# Patient Record
Sex: FEMALE | Race: WHITE | NOT HISPANIC OR LATINO | Employment: FULL TIME | URBAN - METROPOLITAN AREA
[De-identification: names, ages, dates, MRNs, and addresses within clinical notes are randomized per-mention and may not be internally consistent; named-entity substitution may affect disease eponyms.]

---

## 2021-02-14 ENCOUNTER — OFFICE VISIT (OUTPATIENT)
Dept: URGENT CARE | Facility: PHYSICIAN GROUP | Age: 26
End: 2021-02-14
Payer: COMMERCIAL

## 2021-02-14 ENCOUNTER — HOSPITAL ENCOUNTER (OUTPATIENT)
Dept: RADIOLOGY | Facility: MEDICAL CENTER | Age: 26
End: 2021-02-14
Attending: FAMILY MEDICINE
Payer: COMMERCIAL

## 2021-02-14 VITALS
BODY MASS INDEX: 31.02 KG/M2 | SYSTOLIC BLOOD PRESSURE: 110 MMHG | WEIGHT: 158 LBS | OXYGEN SATURATION: 95 % | DIASTOLIC BLOOD PRESSURE: 76 MMHG | HEART RATE: 99 BPM | TEMPERATURE: 97.6 F | HEIGHT: 60 IN | RESPIRATION RATE: 14 BRPM

## 2021-02-14 DIAGNOSIS — S93.402A SPRAIN OF LEFT ANKLE, UNSPECIFIED LIGAMENT, INITIAL ENCOUNTER: ICD-10-CM

## 2021-02-14 PROCEDURE — 99203 OFFICE O/P NEW LOW 30 MIN: CPT | Performed by: FAMILY MEDICINE

## 2021-02-14 PROCEDURE — 73610 X-RAY EXAM OF ANKLE: CPT | Mod: LT

## 2021-02-14 PROCEDURE — 73630 X-RAY EXAM OF FOOT: CPT | Mod: LT

## 2021-02-14 RX ORDER — NORETHINDRONE ACETATE/ETHINYL ESTRADIOL 1MG-20MCG
KIT ORAL
COMMUNITY
Start: 2021-02-02

## 2021-02-14 ASSESSMENT — PAIN SCALES - GENERAL: PAINLEVEL: 5=MODERATE PAIN

## 2021-02-14 NOTE — PROGRESS NOTES
Subjective:      Chief Complaint   Patient presents with   • Ankle Injury     left ankle injury, fell and hit head and ankle, swelling,              Ankle Injury   The incident occurred LAST NIGHT. . The incident occurred while: getting out of bed. The injury mechanism was an inversion injury. The pain is present in the left ankle. The pain is moderate. The pain has been constant since onset. Pertinent negatives include no inability to bear weight, loss of motion, muscle weakness, numbness or tingling. The symptoms are aggravated by weight bearing and palpation. pt has tried acetaminophen for the symptoms. The treatment provided mild relief.       Social History     Tobacco Use   • Smoking status: Never Smoker   • Smokeless tobacco: Never Used   Substance Use Topics   • Alcohol use: Yes     Comment: rare   • Drug use: Never         No past medical history on file.      Review of Systems   Constitutional: Negative for fever.   Respiratory: Negative for shortness of breath.    Cardiovascular: Negative for chest pain.   Neurological: Negative for tingling and numbness.   All other systems reviewed and are negative.         Objective:     /76   Pulse 99   Temp 36.4 °C (97.6 °F)   Resp 14   Ht 1.524 m (5')   Wt 71.7 kg (158 lb)   SpO2 95%     Physical Exam   Constitutional: pt is oriented to person, place, and time. Pt appears well-developed. No distress.   HENT:   Head: Normocephalic and atraumatic.   Eyes: Conjunctivae are normal.   Cardiovascular: Normal rate and regular rhythm.    Pulmonary/Chest: Effort normal and breath sounds normal.   Musculoskeletal:        Left ankle: pt exhibits swelling and ecchymosis. Pt exhibits normal range of motion. Tenderness. Lateral malleolus tenderness found. Achilles tendon normal.        Left foot: There is normal range of motion, normal capillary refill and no crepitus.   Neurological: pt is alert and oriented to person, place, and time. No cranial nerve deficit.      Skin: Skin is warm. Pt is not diaphoretic. No erythema.   Psychiatric: His behavior is normal.   Nursing note and vitals reviewed.         DX-FOOT-COMPLETE 3+ LEFT  Order: [id number]  Status:  Final result   Visible to patient:  No (scheduled for 2/16/2021  1:03 AM) Next appt:  None Dx:  Sprain of left ankle, unspecified lig...  Details    Reading Physician Reading Date Result Priority   Sin Rodriguez M.D.  [phone] 2/14/2021 Urgent [facility]      Narrative & Impression        2/14/2021 12:42 PM     HISTORY/REASON FOR EXAM:  Pain/Deformity Following Trauma  Foot swelling     TECHNIQUE/EXAM DESCRIPTION AND NUMBER OF VIEWS:  3 views of the LEFT foot.     COMPARISON:  None.     FINDINGS:     No acute fracture or dislocation.     No joint osteoarthritis.        IMPRESSION:        No acute osseous abnormality.           Reading Physician Reading Date Result Priority   Sin Rodriguez M.D.  [phone] 2/14/2021 Urgent Care      Narrative & Impression        2/14/2021 12:42 PM     HISTORY/REASON FOR EXAM:  pain; Pain/Deformity Following Trauma  Ankle swelling     TECHNIQUE/EXAM DESCRIPTION AND NUMBER OF VIEWS:  3 views of the LEFT ankle.     COMPARISON: None.     FINDINGS:  No acute fracture or dislocation. The ankle mortise is intact.     No joint arthropathy.     IMPRESSION:     No acute osseous abnormality.                          Assessment/Plan:      1. Sprain of left ankle, unspecified ligament, initial encounter     X-rays were personally reviewed by myself.   There is no fracture.       Crutches  aircast splint  rx motrin 800mg tid prn      Follow up in one week if no improvement, sooner if symptoms worsen.

## 2021-03-18 ENCOUNTER — HOSPITAL ENCOUNTER (EMERGENCY)
Facility: MEDICAL CENTER | Age: 26
End: 2021-03-18
Attending: EMERGENCY MEDICINE
Payer: COMMERCIAL

## 2021-03-18 ENCOUNTER — OFFICE VISIT (OUTPATIENT)
Dept: URGENT CARE | Facility: CLINIC | Age: 26
End: 2021-03-18
Payer: COMMERCIAL

## 2021-03-18 ENCOUNTER — APPOINTMENT (OUTPATIENT)
Dept: RADIOLOGY | Facility: MEDICAL CENTER | Age: 26
End: 2021-03-18
Attending: EMERGENCY MEDICINE
Payer: COMMERCIAL

## 2021-03-18 VITALS
HEART RATE: 96 BPM | HEIGHT: 60 IN | TEMPERATURE: 99.7 F | OXYGEN SATURATION: 96 % | BODY MASS INDEX: 30.63 KG/M2 | DIASTOLIC BLOOD PRESSURE: 70 MMHG | RESPIRATION RATE: 16 BRPM | SYSTOLIC BLOOD PRESSURE: 120 MMHG | WEIGHT: 156 LBS

## 2021-03-18 VITALS
DIASTOLIC BLOOD PRESSURE: 75 MMHG | BODY MASS INDEX: 30.53 KG/M2 | OXYGEN SATURATION: 96 % | WEIGHT: 156.31 LBS | RESPIRATION RATE: 18 BRPM | TEMPERATURE: 98.7 F | HEART RATE: 98 BPM | SYSTOLIC BLOOD PRESSURE: 117 MMHG

## 2021-03-18 DIAGNOSIS — R07.9 ACUTE CHEST PAIN: ICD-10-CM

## 2021-03-18 DIAGNOSIS — R07.9 CHEST PAIN, UNSPECIFIED TYPE: ICD-10-CM

## 2021-03-18 DIAGNOSIS — R06.02 SHORTNESS OF BREATH: ICD-10-CM

## 2021-03-18 DIAGNOSIS — R06.00 DYSPNEA, UNSPECIFIED TYPE: ICD-10-CM

## 2021-03-18 LAB
ALBUMIN SERPL BCP-MCNC: 4.7 G/DL (ref 3.2–4.9)
ALBUMIN/GLOB SERPL: 1.4 G/DL
ALP SERPL-CCNC: 79 U/L (ref 30–99)
ALT SERPL-CCNC: 15 U/L (ref 2–50)
ANION GAP SERPL CALC-SCNC: 16 MMOL/L (ref 7–16)
AST SERPL-CCNC: 13 U/L (ref 12–45)
BASOPHILS # BLD AUTO: 0.4 % (ref 0–1.8)
BASOPHILS # BLD: 0.07 K/UL (ref 0–0.12)
BILIRUB SERPL-MCNC: 0.4 MG/DL (ref 0.1–1.5)
BUN SERPL-MCNC: 7 MG/DL (ref 8–22)
CALCIUM SERPL-MCNC: 9.7 MG/DL (ref 8.5–10.5)
CHLORIDE SERPL-SCNC: 102 MMOL/L (ref 96–112)
CO2 SERPL-SCNC: 19 MMOL/L (ref 20–33)
CREAT SERPL-MCNC: 0.7 MG/DL (ref 0.5–1.4)
D DIMER PPP IA.FEU-MCNC: <0.27 UG/ML (FEU) (ref 0–0.5)
EKG IMPRESSION: NORMAL
EOSINOPHIL # BLD AUTO: 0.09 K/UL (ref 0–0.51)
EOSINOPHIL NFR BLD: 0.6 % (ref 0–6.9)
ERYTHROCYTE [DISTWIDTH] IN BLOOD BY AUTOMATED COUNT: 37 FL (ref 35.9–50)
GLOBULIN SER CALC-MCNC: 3.4 G/DL (ref 1.9–3.5)
GLUCOSE SERPL-MCNC: 102 MG/DL (ref 65–99)
HCG SERPL QL: NEGATIVE
HCT VFR BLD AUTO: 45.5 % (ref 37–47)
HGB BLD-MCNC: 14.6 G/DL (ref 12–16)
IMM GRANULOCYTES # BLD AUTO: 0.07 K/UL (ref 0–0.11)
IMM GRANULOCYTES NFR BLD AUTO: 0.4 % (ref 0–0.9)
LIPASE SERPL-CCNC: 33 U/L (ref 11–82)
LYMPHOCYTES # BLD AUTO: 1.95 K/UL (ref 1–4.8)
LYMPHOCYTES NFR BLD: 12.3 % (ref 22–41)
MCH RBC QN AUTO: 26.8 PG (ref 27–33)
MCHC RBC AUTO-ENTMCNC: 32.1 G/DL (ref 33.6–35)
MCV RBC AUTO: 83.5 FL (ref 81.4–97.8)
MONOCYTES # BLD AUTO: 1.2 K/UL (ref 0–0.85)
MONOCYTES NFR BLD AUTO: 7.6 % (ref 0–13.4)
NEUTROPHILS # BLD AUTO: 12.5 K/UL (ref 2–7.15)
NEUTROPHILS NFR BLD: 78.7 % (ref 44–72)
NRBC # BLD AUTO: 0 K/UL
NRBC BLD-RTO: 0 /100 WBC
PLATELET # BLD AUTO: 369 K/UL (ref 164–446)
PMV BLD AUTO: 8.8 FL (ref 9–12.9)
POTASSIUM SERPL-SCNC: 4.1 MMOL/L (ref 3.6–5.5)
PROT SERPL-MCNC: 8.1 G/DL (ref 6–8.2)
RBC # BLD AUTO: 5.45 M/UL (ref 4.2–5.4)
SODIUM SERPL-SCNC: 137 MMOL/L (ref 135–145)
TROPONIN T SERPL-MCNC: 6 NG/L (ref 6–19)
WBC # BLD AUTO: 15.9 K/UL (ref 4.8–10.8)

## 2021-03-18 PROCEDURE — 36415 COLL VENOUS BLD VENIPUNCTURE: CPT

## 2021-03-18 PROCEDURE — 84484 ASSAY OF TROPONIN QUANT: CPT

## 2021-03-18 PROCEDURE — 84703 CHORIONIC GONADOTROPIN ASSAY: CPT

## 2021-03-18 PROCEDURE — 99215 OFFICE O/P EST HI 40 MIN: CPT | Performed by: NURSE PRACTITIONER

## 2021-03-18 PROCEDURE — 85379 FIBRIN DEGRADATION QUANT: CPT

## 2021-03-18 PROCEDURE — 93005 ELECTROCARDIOGRAM TRACING: CPT | Performed by: EMERGENCY MEDICINE

## 2021-03-18 PROCEDURE — 71045 X-RAY EXAM CHEST 1 VIEW: CPT

## 2021-03-18 PROCEDURE — 93000 ELECTROCARDIOGRAM COMPLETE: CPT | Performed by: NURSE PRACTITIONER

## 2021-03-18 PROCEDURE — 85025 COMPLETE CBC W/AUTO DIFF WBC: CPT

## 2021-03-18 PROCEDURE — 80053 COMPREHEN METABOLIC PANEL: CPT

## 2021-03-18 PROCEDURE — 83690 ASSAY OF LIPASE: CPT

## 2021-03-18 PROCEDURE — 99283 EMERGENCY DEPT VISIT LOW MDM: CPT

## 2021-03-18 PROCEDURE — 93005 ELECTROCARDIOGRAM TRACING: CPT

## 2021-03-18 ASSESSMENT — ENCOUNTER SYMPTOMS
LOWER EXTREMITY EDEMA: 0
BACK PAIN: 0
COUGH: 0
PALPITATIONS: 0
SHORTNESS OF BREATH: 1
HEADACHES: 0
EYE REDNESS: 0
SORE THROAT: 0
MYALGIAS: 0
IRREGULAR HEARTBEAT: 0
FEVER: 0
VOMITING: 0
CHILLS: 0
NAUSEA: 0
EXERTIONAL CHEST PRESSURE: 1
DIZZINESS: 0
NEAR-SYNCOPE: 0

## 2021-03-19 NOTE — ED PROVIDER NOTES
ED Provider Note    CHIEF COMPLAINT  Chief Complaint   Patient presents with   • Chest Pressure     Pt had the second covid vaccine on Tuesday and has had chills, fever and body aches. Pt started to have nausea today and chest tightness that started at 1200. History of asthma, pt used home albuterol a few times and that helped with the chest tightness. Pt went to  for work up, sent here for labs and cardiac f/u.   • Shortness of Breath        HPI    Primary care provider: FREDDY Danielle   History obtained from: Patient and mother  History limited by: None     Monse Blandon is a 25 y.o. female who presents to the ED with mother complaining of chest discomfort located across her upper chest with shortness of breath.  She reports that the pain is worse with walking, movement or taking a deep breath and slightly better with breathing out.  She denies radiation or pain elsewhere.  She reports having her second COVID-19 vaccine 2 days ago and has had some chills and possibly fever but did not check her temperature.  She also has had body aches.  She noticed the chest tightness and shortness of breath that started around noon today while she was laying on the ground because she was feeling nauseated but did not throw up.  She does have history of asthma and used her albuterol inhaler a couple times which seemed to have helped a little bit.  She went to urgent care and was sent here for further evaluation.  She denies history of heart problems or blood clots.  Maternal grandfather had CABG and CHF runs on the father's side.  Patient denies possibility of pregnancy.  She denies cough/diarrhea/dysuria/rash.     REVIEW OF SYSTEMS  Please see HPI for pertinent positives/negatives.  All other systems reviewed and are negative.     PAST MEDICAL HISTORY  Past Medical History:   Diagnosis Date   • Asthma         SURGICAL HISTORY  History reviewed. No pertinent surgical history.     SOCIAL HISTORY  Social History      Tobacco Use   • Smoking status: Never Smoker   • Smokeless tobacco: Never Used   Substance and Sexual Activity   • Alcohol use: Yes     Comment: rare   • Drug use: Yes     Types: Inhaled     Comment: marijuana   • Sexual activity: Not on file        FAMILY HISTORY  History reviewed. No pertinent family history.     CURRENT MEDICATIONS  Home Medications     Reviewed by Leena Andre R.N. (Registered Nurse) on 03/18/21 at 2037  Med List Status: Complete   Medication Last Dose Status   ADVAIR DISKUS 100-50 MCG/DOSE AEROSOL POWDER, BREATH ACTIVATED  Active   ALBUTEROL SULFATE INH  Active   NELSON 1/20 1-20 MG-MCG per tablet  Active                 ALLERGIES  Allergies   Allergen Reactions   • Sulfa Drugs      childhood        PHYSICAL EXAM  VITAL SIGNS: /75   Pulse 98   Temp 37.1 °C (98.7 °F) (Tympanic)   Resp 18   Wt 70.9 kg (156 lb 4.9 oz)   LMP 02/25/2021   SpO2 96%   BMI 30.53 kg/m²  @[id number]::@     Pulse ox interpretation: 97% I interpret this pulse ox as normal     Cardiac monitor interpretation: Sinus rhythm with heart rate in the 90s as interpreted by me.  The patient presented with chest pain and dyspnea and cardiac monitor was ordered to monitor for dysrhythmia.    Constitutional: Well developed, well nourished, alert in no apparent distress, nontoxic appearance    HENT: No external signs of trauma, normocephalic, mask on due to COVID-19 pandemic  Eyes: PERRL, conjunctiva without erythema, no discharge, no icterus    Neck: Soft and supple, trachea midline, no stridor, no tenderness, no LAD, no JVD, good ROM    Cardiovascular: Regular rate and rhythm, no murmurs/rubs/gallops, strong distal pulses and good perfusion    Thorax & Lungs: No respiratory distress, CTAB   Abdomen: Soft, nontender, nondistended, no guarding, no rebound, normal BS    Back: No CVAT    Extremities: No cyanosis, no edema, no gross deformity, good ROM, no tenderness, intact distal pulses with brisk cap refill     Skin: Warm, dry, no pallor/cyanosis, no rash noted    Lymphatic: No lymphadenopathy noted    Neuro: A/O times 3, no focal deficits noted    Psychiatric: Cooperative, tearful and slightly anxious, normal judgement      DIAGNOSTIC STUDIES / PROCEDURES    EKG  12 Lead EKG obtained at 2027 and interpreted by me:   Rate: 93   Rhythm: Sinus rhythm   Ectopy: None  Intervals: Normal   Axis: Normal   QRS: Normal   ST segments: Normal  T Waves: Normal    Clinical Impression: Sinus rhythm without acute ischemic changes or dysrhythmia       LABS  All labs reviewed by me.     Results for orders placed or performed during the hospital encounter of 03/18/21   CBC with Differential   Result Value Ref Range    WBC 15.9 (H) 4.8 - 10.8 K/uL    RBC 5.45 (H) 4.20 - 5.40 M/uL    Hemoglobin 14.6 12.0 - 16.0 g/dL    Hematocrit 45.5 37.0 - 47.0 %    MCV 83.5 81.4 - 97.8 fL    MCH 26.8 (L) 27.0 - 33.0 pg    MCHC 32.1 (L) 33.6 - 35.0 g/dL    RDW 37.0 35.9 - 50.0 fL    Platelet Count 369 164 - 446 K/uL    MPV 8.8 (L) 9.0 - 12.9 fL    Neutrophils-Polys 78.70 (H) 44.00 - 72.00 %    Lymphocytes 12.30 (L) 22.00 - 41.00 %    Monocytes 7.60 0.00 - 13.40 %    Eosinophils 0.60 0.00 - 6.90 %    Basophils 0.40 0.00 - 1.80 %    Immature Granulocytes 0.40 0.00 - 0.90 %    Nucleated RBC 0.00 /100 WBC    Neutrophils (Absolute) 12.50 (H) 2.00 - 7.15 K/uL    Lymphs (Absolute) 1.95 1.00 - 4.80 K/uL    Monos (Absolute) 1.20 (H) 0.00 - 0.85 K/uL    Eos (Absolute) 0.09 0.00 - 0.51 K/uL    Baso (Absolute) 0.07 0.00 - 0.12 K/uL    Immature Granulocytes (abs) 0.07 0.00 - 0.11 K/uL    NRBC (Absolute) 0.00 K/uL   Complete Metabolic Panel (CMP)   Result Value Ref Range    Sodium 137 135 - 145 mmol/L    Potassium 4.1 3.6 - 5.5 mmol/L    Chloride 102 96 - 112 mmol/L    Co2 19 (L) 20 - 33 mmol/L    Anion Gap 16.0 7.0 - 16.0    Glucose 102 (H) 65 - 99 mg/dL    Bun 7 (L) 8 - 22 mg/dL    Creatinine 0.70 0.50 - 1.40 mg/dL    Calcium 9.7 8.5 - 10.5 mg/dL    AST(SGOT) 13 12 -  45 U/L    ALT(SGPT) 15 2 - 50 U/L    Alkaline Phosphatase 79 30 - 99 U/L    Total Bilirubin 0.4 0.1 - 1.5 mg/dL    Albumin 4.7 3.2 - 4.9 g/dL    Total Protein 8.1 6.0 - 8.2 g/dL    Globulin 3.4 1.9 - 3.5 g/dL    A-G Ratio 1.4 g/dL   Troponin   Result Value Ref Range    Troponin T 6 6 - 19 ng/L   BETA-HCG QUALITATIVE SERUM   Result Value Ref Range    Beta-Hcg Qualitative Serum Negative Negative   D-DIMER   Result Value Ref Range    D-Dimer Screen <0.27 0.00 - 0.50 ug/mL (FEU)   LIPASE   Result Value Ref Range    Lipase 33 11 - 82 U/L   ESTIMATED GFR   Result Value Ref Range    GFR If African American >60 >60 mL/min/1.73 m 2    GFR If Non African American >60 >60 mL/min/1.73 m 2   EKG   Result Value Ref Range    Report       [facility] Emergency Dept[facility]    Test Date:  2021  Pt Name:    LEONA GUADARRAMA                   Department: ER  MRN:        [id number]                      Room:  Gender:     Female                       Technician: [id number]  :        1995                   Requested By:ER TRIAGE PROTOCOL  Order #:    [id number]                    Reading MD:    Measurements  Intervals                                Axis  Rate:       93                           P:          60  AK:         144                          QRS:        78  QRSD:       82                           T:          12  QT:         352  QTc:        438    Interpretive Statements  SINUS RHYTHM  No previous ECG available for comparison          RADIOLOGY  The radiologist's interpretation of all radiological studies have been reviewed by me.     DX-CHEST-PORTABLE (1 VIEW)   Final Result         1.  No acute cardiopulmonary disease.             COURSE & MEDICAL DECISION MAKING  Nursing notes, VS, PMSFHx reviewed in chart.     Review of past medical records shows the patient was seen at urgent care today for her symptoms and sent to the ED for further evaluation.      Differential diagnoses considered include but are not  limited to: AMI, dissection, PE, pneumothorax, CHF/pulm edema, pericardial effusion/tamponade, myocarditis, pericarditis, pneumonia, pleural effusion, pleurisy, costochondritis, esophageal spasm, GERD, gastritis, PUD, hiatal hernia, pancreatitis, muscle strain, neuropathy       Pt risk-stratified as low risk for MACE in the next 6 weeks by HEART Score (0-3): 1    HISTORY  Highly suspicious +2  Moderately suspicious +1  Slightly suspicious 0    EKG  Significant ST depression +2  Nonspecific repolarization disturbance +1  Normal 0    AGE  ? 65 +2  45-65 +1  < 45 0    RISK FACTORS  Hypercholesterolemia, HTN, DM, Cigarette smoking, positive family history, obesity  ? 3 risk factors or history of atherosclerotic disease +2  1-2 risk factors +1  No risk factors known 0    TROPONIN  ? 3× normal limit +2  1-3× normal limit +1  ? normal limit 0      History and physical exam as above.  EKG and chest x-ray were unremarkable.  Labs are also fairly unremarkable except for mild leukocytosis likely stress reaction rather than acute infectious etiology.  Patient was monitored in the ED and remained clinically stable.  I discussed the findings with the patient and mother.  This is a pleasant well-appearing patient in no acute distress and nontoxic in appearance with a benign exam.  She reports feeling better.  Unknown etiology for her symptoms but low clinical suspicion at this time for acute serious pathology given the history/exam/findings.  Discussed with them possible vaccination reaction and supportive home care.  No evidence of airway compromise, respiratory distress or anaphylactic reaction.  Return to ED precautions were given and they were advised on outpatient follow-up.  They verbalized understanding and agreed with plan of care with no further questions or concerns.      The patient is referred to a primary physician for blood pressure management, diabetic screening, and for all other preventative health concerns.        FINAL IMPRESSION  1. Acute chest pain Acute   2. Dyspnea, unspecified type Acute          DISPOSITION  Patient will be discharged home in stable condition.       FOLLOW UP  MARGIE DanielleN.  [address]  [facility] [id number]  [facility] [id number] [phone]    Call in 1 day      [facility], Emergency Dept  [address] [facility] [address] [phone]    If symptoms worsen         OUTPATIENT MEDICATIONS  Discharge Medication List as of 3/18/2021 10:29 PM             Electronically signed by: Giovani Newman D.O., 3/18/2021 8:53 PM      Portions of this record were made with voice recognition software.  Despite my review, spelling/grammar/context errors may still remain.  Interpretation of this chart should be taken in this context.

## 2021-03-19 NOTE — PROGRESS NOTES
Subjective:   Monse Blandon is a 25 y.o. female who presents for Chest Pressure (2nd dose of covid vaccine was given 03/16, , worried about her astma, has used her inhaler about 8 times today, chest pain when moving or walking)      Chest Pain   This is a new problem. The current episode started today. The onset quality is undetermined. The problem occurs constantly. The problem has been gradually worsening. The pain is present in the substernal region. The pain is at a severity of 8/10. The pain is moderate. The quality of the pain is described as tightness and pressure. The pain does not radiate. Associated symptoms include exertional chest pressure and shortness of breath. Pertinent negatives include no back pain, cough, dizziness, fever, headaches, irregular heartbeat, lower extremity edema, malaise/fatigue, nausea, near-syncope, palpitations or vomiting. The pain is aggravated by walking, movement and deep breathing. Treatments tried: albuterol  The treatment provided no relief. There are no known risk factors.   Pertinent negatives for past medical history include no anxiety/panic attacks, no hyperlipidemia, no MI, no recent injury and no spontaneous pneumothorax.   Patient did receive her second Covid vaccine 3/16.  Patient has a history of asthma has been using her albuterol inhaler excessively this evening with no improvement.    Review of Systems   Constitutional: Negative for chills, fever and malaise/fatigue.   HENT: Negative for sore throat.    Eyes: Negative for redness.   Respiratory: Positive for shortness of breath. Negative for cough.    Cardiovascular: Positive for chest pain. Negative for palpitations and near-syncope.   Gastrointestinal: Negative for nausea and vomiting.   Genitourinary: Negative for dysuria.   Musculoskeletal: Negative for back pain and myalgias.   Skin: Negative for rash.   Neurological: Negative for dizziness and headaches.       Medications:    • Advair Diskus  Aepb  • ALBUTEROL SULFATE INH  • Abner 1/20 Tabs    Allergies: Sulfa drugs    Problem List: Monse Blandon does not have a problem list on file.    Surgical History:  No past surgical history on file.    Past Social Hx: Monse Blandon  reports that she has never smoked. She has never used smokeless tobacco. She reports current alcohol use. She reports current drug use. Drug: Inhaled.     Past Family Hx:  Monse Blandon family history is not on file.     Problem list, medications, and allergies reviewed by myself today in Epic.     Objective:     /70 (BP Location: Right arm, Patient Position: Sitting, BP Cuff Size: Adult long)   Pulse 96   Temp 37.6 °C (99.7 °F) (Temporal)   Resp 16   Ht 1.524 m (5')   Wt 70.8 kg (156 lb)   LMP 02/25/2021   SpO2 96%   BMI 30.47 kg/m²     Physical Exam  Vitals and nursing note reviewed.   Constitutional:       General: She is not in acute distress.     Appearance: She is well-developed.   HENT:      Head: Normocephalic and atraumatic.      Right Ear: Tympanic membrane and external ear normal.      Left Ear: Tympanic membrane and external ear normal.      Nose: Nose normal.      Right Sinus: No maxillary sinus tenderness or frontal sinus tenderness.      Left Sinus: No maxillary sinus tenderness or frontal sinus tenderness.      Mouth/Throat:      Mouth: Mucous membranes are moist.      Pharynx: Uvula midline. No posterior oropharyngeal erythema.      Tonsils: No tonsillar exudate or tonsillar abscesses.   Eyes:      General:         Right eye: No discharge.         Left eye: No discharge.      Conjunctiva/sclera: Conjunctivae normal.   Cardiovascular:      Rate and Rhythm: Normal rate.      Pulses: Normal pulses.      Heart sounds: Normal heart sounds, S1 normal and S2 normal.   Pulmonary:      Effort: Pulmonary effort is normal. No respiratory distress.      Breath sounds: Normal breath sounds.   Abdominal:      General: There is no distension.   Musculoskeletal:          General: Normal range of motion.   Skin:     General: Skin is warm and dry.   Neurological:      General: No focal deficit present.      Mental Status: She is alert and oriented to person, place, and time. Mental status is at baseline.      Gait: Gait (gait at baseline) normal.   Psychiatric:         Judgment: Judgment normal.         Assessment/Plan:     Diagnosis and associated orders:     1. Chest pain, unspecified type  EKG - Clinic Performed   2. Shortness of breath  EKG - Clinic Performed      Comments/MDM:     EKG interpreted by provider    Rate: Normal  Rhythm: Regular Sinus   QRS Complex: Narrow   Axis: Normal  Interval/Conduction: Normal  Enlargement: None    Ischemia:      ST Segments: no acute change      T Waves: no acute change      Q Waves: none    Comparison: NONE    • Clinical Impression: no acute changes and normal EKG, see scanned sheet    Patient is a 25-year-old female present with the stated above, patient verbalizing throughout exam multiple times the severity of the chest pressure she is experiencing.  Unclear etiology as EKG with no acute ischemia, vital signs stable, lung sounds clear to auscultation bilaterally.  At this time, I feel the patient requires a higher level of care including closer monitoring, stat lab work and/or imaging for further evaluation for her chest pain and shortness of breath.  Differentials include but not limited to PE, chest pain, asthma. this has been discussed with the patient and they state agreement and understanding.  I offered the patient an ambulance ride and  the patient is declining at this time. The patient is in no acute distress upon clinic departure and will go directly to ED without delay.          Please note that this dictation was created using voice recognition software. I have made a reasonable attempt to correct obvious errors, but I expect that there are errors of grammar and possibly content that I did not discover before finalizing the  note.    This note was electronically signed by Clay NARANJO.

## 2021-03-19 NOTE — ED TRIAGE NOTES
Monse Blandon  25 y.o. female  Chief Complaint   Patient presents with   • Chest Pressure     Pt had the second covid vaccine on Tuesday and has had chills, fever and body aches. Pt started to have nausea today and chest tightness that started at 1200. History of asthma, pt used home albuterol a few times and that helped with the chest tightness. Pt went to  for work up, sent here for labs and cardiac f/u.   • Shortness of Breath       Vitals:    03/18/21 2020   BP: 136/91   Pulse: (!) 110   Resp: 12   Temp: 37.1 °C (98.7 °F)   SpO2: 100%        Pt ambulated into triage with steady gait, mask in place, NAD.  Pt educated on triage process. Pt encouraged to alert staff for any changes.

## 2021-03-19 NOTE — ED NOTES
Pt ambulated to room from [facility]. Changed into gown. Connected to monitor. Agree with triage note. Chart up for ERP. Call light in reach.